# Patient Record
Sex: MALE | Race: WHITE | NOT HISPANIC OR LATINO | Employment: OTHER | URBAN - METROPOLITAN AREA
[De-identification: names, ages, dates, MRNs, and addresses within clinical notes are randomized per-mention and may not be internally consistent; named-entity substitution may affect disease eponyms.]

---

## 2024-05-19 ENCOUNTER — HOSPITAL ENCOUNTER (OUTPATIENT)
Dept: RADIOLOGY | Facility: HOSPITAL | Age: 54
Discharge: HOME | End: 2024-05-19
Payer: COMMERCIAL

## 2024-05-19 DIAGNOSIS — J84.10 PULMONARY FIBROSIS, UNSPECIFIED (MULTI): ICD-10-CM

## 2024-05-19 PROCEDURE — 71250 CT THORAX DX C-: CPT | Performed by: RADIOLOGY

## 2024-05-19 PROCEDURE — 71250 CT THORAX DX C-: CPT

## 2024-06-07 ENCOUNTER — OFFICE VISIT (OUTPATIENT)
Dept: OPHTHALMOLOGY | Facility: CLINIC | Age: 54
End: 2024-06-07
Payer: COMMERCIAL

## 2024-06-07 DIAGNOSIS — H25.13 AGE-RELATED NUCLEAR CATARACT OF BOTH EYES: Primary | ICD-10-CM

## 2024-06-07 DIAGNOSIS — H52.7 REFRACTION ERROR: ICD-10-CM

## 2024-06-07 PROCEDURE — 99213 OFFICE O/P EST LOW 20 MIN: CPT | Performed by: OPHTHALMOLOGY

## 2024-06-07 PROCEDURE — 99203 OFFICE O/P NEW LOW 30 MIN: CPT | Performed by: OPHTHALMOLOGY

## 2024-06-07 RX ORDER — DEXTROAMPHETAMINE SACCHARATE, AMPHETAMINE ASPARTATE, DEXTROAMPHETAMINE SULFATE AND AMPHETAMINE SULFATE 1.875; 1.875; 1.875; 1.875 MG/1; MG/1; MG/1; MG/1
1 TABLET ORAL DAILY
COMMUNITY
Start: 2023-12-29 | End: 2024-01-28 | Stop reason: WASHOUT

## 2024-06-07 RX ORDER — SILDENAFIL 50 MG/1
50 TABLET, FILM COATED ORAL DAILY PRN
COMMUNITY
Start: 2023-12-27

## 2024-06-07 RX ORDER — ROSUVASTATIN CALCIUM 5 MG/1
TABLET, COATED ORAL
COMMUNITY
Start: 2024-04-24

## 2024-06-07 ASSESSMENT — VISUAL ACUITY
METHOD: SNELLEN - LINEAR
CORRECTION_TYPE: GLASSES
OD_CC+: -2
OD_CC: 20/20
OS_CC: 20/20

## 2024-06-07 ASSESSMENT — KERATOMETRY
OD_AXISANGLE_DEGREES: 10
OS_AXISANGLE2_DEGREES: 75
OD_AXISANGLE2_DEGREES: 100
OS_K1POWER_DIOPTERS: 42.25
OS_AXISANGLE_DEGREES: 165
OD_K2POWER_DIOPTERS: 43.25
OD_K1POWER_DIOPTERS: 42.00
OS_K2POWER_DIOPTERS: 43.00

## 2024-06-07 ASSESSMENT — TONOMETRY
IOP_METHOD: GOLDMANN APPLANATION
OS_IOP_MMHG: 16
OD_IOP_MMHG: 16

## 2024-06-07 ASSESSMENT — ENCOUNTER SYMPTOMS
CONSTITUTIONAL NEGATIVE: 0
GASTROINTESTINAL NEGATIVE: 0
EYES NEGATIVE: 0
MUSCULOSKELETAL NEGATIVE: 0
ENDOCRINE NEGATIVE: 0
CARDIOVASCULAR NEGATIVE: 0
HEMATOLOGIC/LYMPHATIC NEGATIVE: 0
RESPIRATORY NEGATIVE: 0
NEUROLOGICAL NEGATIVE: 0
PSYCHIATRIC NEGATIVE: 0
ALLERGIC/IMMUNOLOGIC NEGATIVE: 0

## 2024-06-07 ASSESSMENT — EXTERNAL EXAM - RIGHT EYE: OD_EXAM: NORMAL

## 2024-06-07 ASSESSMENT — REFRACTION_WEARINGRX
OD_SPHERE: -2.00
OS_AXIS: 073
OS_SPHERE: -2.50
OD_AXIS: 093
OS_CYLINDER: -1.25
OD_CYLINDER: -1.50

## 2024-06-07 ASSESSMENT — PATIENT HEALTH QUESTIONNAIRE - PHQ9
2. FEELING DOWN, DEPRESSED OR HOPELESS: NOT AT ALL
1. LITTLE INTEREST OR PLEASURE IN DOING THINGS: NOT AT ALL
SUM OF ALL RESPONSES TO PHQ9 QUESTIONS 1 AND 2: 0

## 2024-06-07 ASSESSMENT — SLIT LAMP EXAM - LIDS
COMMENTS: NORMAL
COMMENTS: NORMAL

## 2024-06-07 ASSESSMENT — EXTERNAL EXAM - LEFT EYE: OS_EXAM: NORMAL

## 2024-06-07 ASSESSMENT — CUP TO DISC RATIO
OD_RATIO: 0.3
OS_RATIO: 0.3

## 2024-06-07 ASSESSMENT — PAIN SCALES - GENERAL: PAINLEVEL: 0-NO PAIN

## 2024-06-07 NOTE — PROGRESS NOTES
Assessment/Plan   Problem List Items Addressed This Visit       Age-related nuclear cataract of both eyes - Primary     Non significant cataract noted on exam. Will plan to continue to monitor with serial exam.            Refraction error     Discussed glasses prescription from refraction. Will provide if patient interested in keeping for records or to fill as a new set of glasses. Has worn soft contact lens (SCL) in past, current lenses at home are many years old. Advised if interested in new RX for soft contact lens (SCL) we would do records request to prior provider and could bring in to discuss options.             Provided reassurance regarding above diagnoses and care received in the office visit today. Discussed outcomes and options along with the importance of treatment compliance. Understands the importance of any follow up visits. Patient instructed to call/communicate with our office if any new issues, questions, or concerns.     Will plan to see back in 2 year full or sooner PRN

## 2024-06-07 NOTE — PATIENT INSTRUCTIONS
Thank you so much for choosing me to provide your care today!    If you were dilated your vision may remain blurry   or light sensitive for several hours.    The nature of eye and vision problems can require frequent follow up, please make every effort to adhere to any future appointments.    If you have any issues, questions, or concerns,   please do not hesitate to reach out.    If you receive a survey in regards to your care today, please mention any exceptional care my office staff and/or technicians provided.    You can reach our office at this number:  [phone]

## 2024-06-07 NOTE — ASSESSMENT & PLAN NOTE
Discussed glasses prescription from refraction. Will provide if patient interested in keeping for records or to fill as a new set of glasses. Has worn soft contact lens (SCL) in past, current lenses at home are many years old. Advised if interested in new RX for soft contact lens (SCL) we would do records request to prior provider and could bring in to discuss options.